# Patient Record
Sex: MALE | Race: WHITE | Employment: UNEMPLOYED | URBAN - METROPOLITAN AREA
[De-identification: names, ages, dates, MRNs, and addresses within clinical notes are randomized per-mention and may not be internally consistent; named-entity substitution may affect disease eponyms.]

---
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
## 2020-04-22 ENCOUNTER — NURSE TRIAGE (OUTPATIENT)
Dept: PEDIATRICS | Facility: CLINIC | Age: 20
End: 2020-04-22

## 2020-04-22 NOTE — LETTER
[facility]  [address] [address]  [phone]      4/22/2020       Eswin I Schouweiler                                                                                                                                         [address] [address] [facility] [address]              Dear Mr. Schouweiler,    This letter comes as a reminder that you are due to schedule an office appointment,  related to your diagnosis of depression.  Please call [phone] to schedule an appointment.            Sincerely,    Dr Gerber  [facility]

## 2020-07-13 ENCOUNTER — APPOINTMENT (OUTPATIENT)
Dept: CT IMAGING | Facility: CLINIC | Age: 20
End: 2020-07-13
Attending: PHYSICIAN ASSISTANT
Payer: COMMERCIAL

## 2020-07-13 ENCOUNTER — HOSPITAL ENCOUNTER (EMERGENCY)
Facility: CLINIC | Age: 20
Discharge: HOME | End: 2020-07-13
Attending: PHYSICIAN ASSISTANT | Admitting: PHYSICIAN ASSISTANT
Payer: COMMERCIAL

## 2020-07-13 VITALS
BODY MASS INDEX: 23.56 KG/M2 | HEART RATE: 60 BPM | SYSTOLIC BLOOD PRESSURE: 114 MMHG | TEMPERATURE: 97.4 F | HEIGHT: 64 IN | OXYGEN SATURATION: 98 % | DIASTOLIC BLOOD PRESSURE: 60 MMHG | WEIGHT: 138 LBS | RESPIRATION RATE: 16 BRPM

## 2020-07-13 DIAGNOSIS — S06.0X0A CONCUSSION WITHOUT LOSS OF CONSCIOUSNESS, INITIAL ENCOUNTER: ICD-10-CM
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
## 2020-07-13 DIAGNOSIS — F12.90 MARIJUANA USE: ICD-10-CM

## 2020-07-13 PROCEDURE — 70450 CT HEAD/BRAIN W/O DYE: CPT

## 2020-07-13 PROCEDURE — 72125 CT NECK SPINE W/O DYE: CPT

## 2020-07-13 PROCEDURE — 99284 EMERGENCY DEPT VISIT MOD MDM: CPT | Mod: 25

## 2020-07-13 ASSESSMENT — ENCOUNTER SYMPTOMS
SHORTNESS OF BREATH: 0
NAUSEA: 1
BACK PAIN: 0
FEVER: 0
MYALGIAS: 0
COUGH: 0
VOMITING: 0
ABDOMINAL PAIN: 0
HEADACHES: 1
NECK PAIN: 0

## 2020-07-13 ASSESSMENT — MIFFLIN-ST. JEOR: SCORE: 1551.96

## 2020-07-13 NOTE — ED AVS SNAPSHOT
Emergency Department  [address] [facility] [address]  Phone:  [phone]  Fax:  [phone]                                    Eswin I Schouweiler   MRN: [id number]    Department:   Emergency Department   Date of Visit:  7/13/2020           After Visit Summary Signature Page    I have received my discharge instructions, and my questions have been answered. I have discussed any challenges I see with this plan with the nurse or doctor.    ..........................................................................................................................................  Patient/Patient Representative Signature      ..........................................................................................................................................  Patient Representative Print Name and Relationship to Patient    ..................................................               ................................................  Date                                   Time    ..........................................................................................................................................  Reviewed by Signature/Title    ...................................................              ..............................................  Date                                               Time          22EPIC Rev 08/18
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
## 2020-07-14 NOTE — DISCHARGE INSTRUCTIONS
Discharge Instructions  Concussion    You were seen today for signs of a concussion.  The symptoms will vary, depending on the nature of your injury and your health. You may have: headache, confusion, nausea (feel sick to your stomach), vomiting (throwing up) and problems with memory, concentrating, or sleep. You may feel dizzy, irritable, and tired. Children and teens may need help from their parents, teachers, and coaches to watch for symptoms as they recover.    Generally, every Emergency Department visit should have a follow-up clinic visit with either a primary or a specialty clinic/provider. Please follow-up as instructed by your emergency provider today.     Return to the Emergency Department if:  Your headache gets worse or you start to have a really bad headache even with the recommended treatment plan.   You feel drowsier, have growing confusion, or slurred speech.   You keep repeating yourself.   You have strange behavior or are feeling more irritable.   You have a seizure.   You vomit (throw up) more than once.   You have trouble walking.   You have weakness or numbness.  Your neck pain gets worse.   You have a loss of consciousness.   You have blood for fluid coming from your ears or nose.   You have new symptoms or anything that worries you.     Home Care:  Get lots of rest and get enough sleep at night. Take daytime naps or rest if you feel tired.   Limit physical activity and  thinking  activities. These can make symptoms worse.   Physical activities include gym, sports, weight training, running, exercise, and heavy lifting.   Thinking activities include homework, class work, job-related work, and screen time (phone, computer, tablet, TV, and video games).   Stick to a healthy diet and drink lots of fluids. Avoid alcohol.  As symptoms improve, you may slowly return to your daily activities. If symptoms get worse or return, reduce your activity.   Know that it is normal to feel sad or frustrated when  you do not feel right and are less active.     Going Back to Work:  Your care team will tell you when you are ready to return to work.    Limit the amount of work you do soon after your injury. This may speed healing. Take breaks if your symptoms get worse. You should also reduce your physical activity as well as activities that require a lot of thinking until you see your doctor. You may need shorter work days and a lighter workload.  Avoid heavy lifting, working with machinery, driving and working at heights until your symptoms are gone or you are cleared by a provider.    Going Back to School:  If you are still having symptoms, you may need extra help at school.  Tell your teachers and school nurse about your injury and symptoms. Ask them to watch for problems with learning, memory, and concentrating. Symptoms may get worse when you do schoolwork, and you may become more irritable. You may need shorter school days, a reduced workload, and to postpone testing.  Do not drive or take gym class (physical activity) until cleared by a provider.    Returning to Sports:  Never return to play if you have any symptoms. A full recovery will reduce the chances of getting hurt again. Remember, it is better to miss one or two games than a whole season.  You should rest from all physical activity until you see your provider. Generally, if all symptoms have completely cleared, your provider can help guide you to slowly return to sports. If symptoms return or worsen, stop the activity and see your provider.  Important: If you are in an organized sport and under age 18, you will need written consent from a healthcare provider before you return to sports. Typically, this will be your primary care or sports medicine provider. Please make an appointment.    If you were given a prescription for medicine here today, be sure to read all of the information (including the package insert) that comes with your prescription.  This will  include important information about the medicine, its side effects, and any warnings that you need to know about.  The pharmacist who fills the prescription can provide more information and answer questions you may have about the medicine.  If you have questions or concerns that the pharmacist cannot address, please call or return to the Emergency Department.     Remember that you can always come back to the Emergency Department if you are not able to see your regular provider in the amount of time listed above, if you get any new symptoms, or if there is anything that worries you.  Discharge Instructions  Head Injury    You have been seen today for a head injury. Your evaluation included a history and physical examination. You may have had a CT (CAT) scan performed, though most head injuries do not require a scan. Based on this evaluation, your provider today does not feel that your head injury is serious.    Generally, every Emergency Department visit should have a follow-up clinic visit with either a primary or a specialty clinic/provider. Please follow-up as instructed by your emergency provider today.  Return to the Emergency Department if:  You are confused or you are not acting right.  Your headache gets worse or you start to have a really bad headache even with your recommended treatment plan.  You vomit (throw up) more than once.  You have a seizure.  You have trouble walking.  You have weakness or paralysis (cannot move) in an arm or a leg.  You have blood or fluid coming from your ears or nose.  You have new symptoms or anything that worries you.    Sleeping:  It is okay for you to sleep, but someone should wake you up if instructed by your provider, and someone should check on you at your usual time to wake up.     Activity:  Do not drive for at least 24 hours.  Do not drive if you have dizzy spells or trouble concentrating, or remembering things.  Do not return to any contact sports until cleared by  your regular provider.     MORE INFORMATION:    Concussion:  A concussion is a minor head injury that may cause temporary problems with the way the brain works. Although concussions are important, they are generally not an emergency or a reason that a person needs to be hospitalized. Some concussion symptoms include confusion, amnesia (forgetful), nausea (sick to your stomach) and vomiting (throwing up), dizziness, fatigue, memory or concentration problems, irritability and sleep problems. For most people, concussions are mild and temporary but some will have more severe and persistent symptoms that require on-going care and treatment.  CT Scans: Your evaluation today may have included a CT scan (CAT scan) to look for things like bleeding or a skull fracture (broken bone).  CT scans involve radiation and too many CT scans can cause serious health problems like cancer, especially in children.  Because of this, your provider may not have ordered a CT scan today if they think you are at low risk for a serious or life threatening problem.    If you were given a prescription for medicine here today, be sure to read all of the information (including the package insert) that comes with your prescription.  This will include important information about the medicine, its side effects, and any warnings that you need to know about.  The pharmacist who fills the prescription can provide more information and answer questions you may have about the medicine.  If you have questions or concerns that the pharmacist cannot address, please call or return to the Emergency Department.     Remember that you can always come back to the Emergency Department if you are not able to see your regular provider in the amount of time listed above, if you get any new symptoms, or if there is anything that worries you.
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
